# Patient Record
Sex: FEMALE | Race: WHITE
[De-identification: names, ages, dates, MRNs, and addresses within clinical notes are randomized per-mention and may not be internally consistent; named-entity substitution may affect disease eponyms.]

---

## 2019-07-09 ENCOUNTER — HOSPITAL ENCOUNTER (EMERGENCY)
Dept: HOSPITAL 25 - UCEAST | Age: 34
Discharge: HOME | End: 2019-07-09
Payer: SELF-PAY

## 2019-07-09 VITALS — DIASTOLIC BLOOD PRESSURE: 70 MMHG | SYSTOLIC BLOOD PRESSURE: 113 MMHG

## 2019-07-09 DIAGNOSIS — N39.0: Primary | ICD-10-CM

## 2019-07-09 PROCEDURE — 81003 URINALYSIS AUTO W/O SCOPE: CPT

## 2019-07-09 PROCEDURE — 87077 CULTURE AEROBIC IDENTIFY: CPT

## 2019-07-09 PROCEDURE — 99202 OFFICE O/P NEW SF 15 MIN: CPT

## 2019-07-09 PROCEDURE — G0463 HOSPITAL OUTPT CLINIC VISIT: HCPCS

## 2019-07-09 PROCEDURE — 74176 CT ABD & PELVIS W/O CONTRAST: CPT

## 2019-07-09 PROCEDURE — 87086 URINE CULTURE/COLONY COUNT: CPT

## 2019-07-09 NOTE — UC
Complaint Female HPI





- HPI Summary


HPI Summary: 


SEVERAL DAYS OF DYSURIA, FREQUENCY AND URGENCY.  TODAY DEVELOPED SOME RIGHT 

FLANK PAIN.  SYMPTOMS SOMEWHAT ALLEVIATED BY AZO.  LAST DOSE ABOUT 12 HOURS 

AGO.  DENIES FEVER.  HAS SOME MILD NAUSEA.





- History Of Current Complaint


Chief Complaint: UCGU


Stated Complaint: URINE PAIN


Time Seen by Provider: 07/09/19 07:53


Hx Obtained From: Patient


Hx Last Menstrual Period: 6/18/19


Onset/Duration: Gradual Onset, Lasting Days, Still Present


Timing: Constant


Severity Initially: Moderate


Severity Currently: Moderate


Pain Intensity: 6


Pain Scale Used: 0-10 Numeric


Character: Burning


Aggravating Factor(s): Urination


Alleviating Factor(s): Nothing


Associated Signs And Symptoms: Positive: Back Pain, Nausea.  Negative: Fever, 

Vaginal Bleeding/Discharge





- Allergies/Home Medications


Allergies/Adverse Reactions: 


 Allergies











Allergy/AdvReac Type Severity Reaction Status Date / Time


 


No Known Allergies Allergy   Verified 07/09/19 08:05











Home Medications: 


 Home Medications





Cranberry Conc/C/Bacill Coag [Azo Cranberry Tablet] 1 tab PO ONCE 07/09/19 [

History Confirmed 07/09/19]











PMH/Surg Hx/FS Hx/Imm Hx


Previously Healthy: Yes





- Surgical History


Surgical History: None





- Family History


Known Family History: Positive: Non-Contributory


   Negative: Respiratory Disease





- Social History


Alcohol Use: None


Substance Use Type: None


Smoking Status (MU): Never Smoked Tobacco





- Immunization History


Most Recent Influenza Vaccination: 10/15/15


Most Recent Tetanus Shot: 11/19/15


Most Recent Pneumonia Vaccination: none





Review of Systems


All Other Systems Reviewed And Are Negative: Yes


Constitutional: Positive: Negative


Respiratory: Positive: Negative


Cardiovascular: Positive: Negative


Gastrointestinal: Positive: Abdominal Pain, Nausea


Genitourinary: Positive: Dysuria, Frequency, Urgency





Physical Exam


Triage Information Reviewed: Yes


Appearance: Well-Appearing, No Pain Distress, Well-Nourished


Vital Signs: 


 Initial Vital Signs











Temp  98.1 F   07/09/19 08:06


 


Pulse  70   07/09/19 08:06


 


Resp  18   07/09/19 08:06


 


BP  113/70   07/09/19 08:06


 


Pulse Ox  100   07/09/19 08:06








 Laboratory Tests











  07/09/19





  08:14


 


POC Urine Color  Orange


 


POC Urine Clarity  Cloudy


 


POC Urine pH  7.0


 


POC Ur Specif Gravity  1.025


 


POC Urine Protein  3+ A


 


POC Ur Glucose (UA)  Negative


 


POC Urine Ketones  Negative


 


POC Urine Blood  1+ A


 


POC Urine Nitrite  Positive A


 


POC Urine Bilirubin  Negative


 


POC Urine Urobilinogen  1.0


 


POC U Leukocyte Esteras  1+ A











Vital Signs Reviewed: Yes


Eyes: Positive: Conjunctiva Clear


ENT: Positive: Hearing grossly normal


Neck: Positive: Supple


Respiratory: Positive: No respiratory distress, No accessory muscle use


Cardiovascular: Positive: Pulses Normal


Abdomen Description: Positive: Soft, CVA Tenderness (R), Other: - MILD 

SUPRAPUBIC/LEFT SIDED ABDOMINAL PAIN.  Negative: CVA Tenderness (L), Distended, 

Guarding


Musculoskeletal: Positive: No Edema


Neurological: Positive: Alert


Psychological: Positive: Age Appropriate Behavior


Skin: Negative: Rashes





 Complaint Female Dx





- Course


Course Of Treatment: 





PATIENT'S URINE DIP GROSSLY POSITIVE INDICATIVE OF UTI HOWEVER GIVEN HER RIGHT 

FLANK PAIN AND CVA TENDERNESS CONCERN FOR DEVELOPING PYELONEPHRITIS OR KIDNEY 

STONE.  CT SCAN OBTAINED AND SHOWED ONLY SOME BLADDER WALL THICKENING 

CONSISTENT WITH CYSTITIS.  WILL TREAT WITH BACTRIM TWICE DAILY FOR 5 DAYS.  

PYRIDIUM IF NEEDED.  ENCOURAGED HYDRATION.  SHE WILL FOLLOW-UP IN THE ER IF SHE 

IS NOT IMPROVING AS EXPECTED.





- Differential Dx/Diagnosis


Provider Diagnosis: 


 UTI (urinary tract infection)








Discharge





- Sign-Out/Discharge


Documenting (check all that apply): Patient Departure


All imaging exams completed and their final reports reviewed: Yes





- Discharge Plan


Condition: Stable


Disposition: HOME


Prescriptions: 


Phenazopyridine TAB* [Pyridium TAB*] 200 mg PO TID #6 tab


Sulfamethox/Trimethoprim DS* [Bactrim /160 TAB*] 1 tab PO BID #10 tab


Patient Education Materials:  Urinary Tract Infection in Women (ED)


Referrals: 


Radha Hall MD [Primary Care Provider] - If Needed


Additional Instructions: 


URINE TEST TODAY INDICATIVE OF URINARY TRACT INFECTION.  WILL COVER WITH 

BACTRIM TWICE DAILY FOR 5 DAYS.  STAY WELL HYDRATED. PYRIDIUM FOR DISCOMFORT IF 

NEEDED.  GIVEN YOUR RIGHT FLANK PAIN CT SCAN WAS ORDERED.  NO EVIDENCE FOR 

KIDNEY STONE OR PYELONEPHRITIS AT PRESENT.  BE SURE TO TAKE THE ANTIBIOTICS FOR 

THE FULL COURSE.  GO TO THE ER WITHOUT FAIL IF YOU DEVELOP WORSENING PAIN, FEVER

, NAUSEA/VOMITING OR ANY OTHER CONCERNING SYMPTOMS.





CT SHOWS:


1. HEPATOMEGALY.


2. NO HYDRONEPHROSIS OR NEPHROLITHIASIS.


3. THERE IS CIRCUMFERENTIAL BLADDER WALL THICKENING. THIS MAY BE ACCENTUATED BY 

INCOMPLETE DISTENTION. THE DIFFERENTIAL INCLUDES CYSTITIS.





BE AWARE THAT TAKING THE ANTIBIOTIC AT THE SAME TIME AS YOUR BIRTH CONTROL PILL 

CAN DECREASE THE CONTRACEPTIVE EFFICACY.  USE BACKUP METHODS OF BIRTH CONTROL 

FOR THE REMAINDER OF THIS CYCLE AND YOUR NEXT CYCLE.





- Billing Disposition and Condition


Condition: STABLE


Disposition: Home